# Patient Record
Sex: FEMALE | Race: BLACK OR AFRICAN AMERICAN | NOT HISPANIC OR LATINO
[De-identification: names, ages, dates, MRNs, and addresses within clinical notes are randomized per-mention and may not be internally consistent; named-entity substitution may affect disease eponyms.]

---

## 2018-01-02 ENCOUNTER — NON-APPOINTMENT (OUTPATIENT)
Age: 43
End: 2018-01-02

## 2018-01-02 ENCOUNTER — APPOINTMENT (OUTPATIENT)
Dept: CARDIOLOGY | Facility: CLINIC | Age: 43
End: 2018-01-02
Payer: MEDICAID

## 2018-01-02 VITALS
HEART RATE: 75 BPM | DIASTOLIC BLOOD PRESSURE: 80 MMHG | HEIGHT: 64 IN | OXYGEN SATURATION: 99 % | WEIGHT: 160 LBS | SYSTOLIC BLOOD PRESSURE: 120 MMHG | BODY MASS INDEX: 27.31 KG/M2

## 2018-01-02 DIAGNOSIS — I25.2 OLD MYOCARDIAL INFARCTION: ICD-10-CM

## 2018-01-02 DIAGNOSIS — R94.31 ABNORMAL ELECTROCARDIOGRAM [ECG] [EKG]: ICD-10-CM

## 2018-01-02 DIAGNOSIS — E11.9 TYPE 2 DIABETES MELLITUS W/OUT COMPLICATIONS: ICD-10-CM

## 2018-01-02 PROCEDURE — 99244 OFF/OP CNSLTJ NEW/EST MOD 40: CPT

## 2018-01-02 PROCEDURE — 93000 ELECTROCARDIOGRAM COMPLETE: CPT

## 2018-01-16 ENCOUNTER — APPOINTMENT (OUTPATIENT)
Dept: CARDIOLOGY | Facility: CLINIC | Age: 43
End: 2018-01-16
Payer: MEDICAID

## 2018-01-16 PROBLEM — I25.2 ECG: OLD MYOCARDIAL INFARCTION: Status: ACTIVE | Noted: 2018-01-16

## 2018-01-16 PROCEDURE — 93306 TTE W/DOPPLER COMPLETE: CPT

## 2020-09-19 ENCOUNTER — EMERGENCY (EMERGENCY)
Facility: HOSPITAL | Age: 45
LOS: 0 days | Discharge: HOME | End: 2020-09-19
Payer: MEDICAID

## 2020-09-19 VITALS
RESPIRATION RATE: 17 BRPM | TEMPERATURE: 98 F | DIASTOLIC BLOOD PRESSURE: 84 MMHG | SYSTOLIC BLOOD PRESSURE: 165 MMHG | OXYGEN SATURATION: 100 % | HEIGHT: 63 IN | WEIGHT: 154.1 LBS | HEART RATE: 95 BPM

## 2020-09-19 DIAGNOSIS — H57.11 OCULAR PAIN, RIGHT EYE: ICD-10-CM

## 2020-09-19 DIAGNOSIS — Z77.098 CONTACT WITH AND (SUSPECTED) EXPOSURE TO OTHER HAZARDOUS, CHIEFLY NONMEDICINAL, CHEMICALS: ICD-10-CM

## 2020-09-19 DIAGNOSIS — E11.9 TYPE 2 DIABETES MELLITUS WITHOUT COMPLICATIONS: ICD-10-CM

## 2020-09-19 DIAGNOSIS — Z79.84 LONG TERM (CURRENT) USE OF ORAL HYPOGLYCEMIC DRUGS: ICD-10-CM

## 2020-09-19 PROCEDURE — 99283 EMERGENCY DEPT VISIT LOW MDM: CPT

## 2020-09-19 RX ORDER — GLIMEPIRIDE 1 MG
1 TABLET ORAL
Qty: 0 | Refills: 0 | DISCHARGE

## 2020-09-19 RX ORDER — METFORMIN HYDROCHLORIDE 850 MG/1
1 TABLET ORAL
Qty: 0 | Refills: 0 | DISCHARGE

## 2020-09-19 RX ORDER — ERYTHROMYCIN BASE 5 MG/GRAM
1 OINTMENT (GRAM) OPHTHALMIC (EYE)
Qty: 1 | Refills: 0
Start: 2020-09-19 | End: 2020-09-23

## 2020-09-19 NOTE — ED PROVIDER NOTE - OBJECTIVE STATEMENT
46 yo female with h/o DM presents to the ED s/p getting chlorox into her right eye while cleaning 1 hour ago. UTD with tetanus. Patient c/o burning to right eye. Patient states she irrigated her at home with some improvement. Denies fever, chills, headache, dizziness, visual changes, eye discharge/excessive tearing, chest pain, sob, abd pain, N/V, Ue/LE weakness or paresthesias. no glasses or contacts.

## 2020-09-19 NOTE — ED PROVIDER NOTE - RIGHT EYE
clear/pupils equal, round, and reactive to light/+ mild conjunctival erythema, no FB, no uptake, neg zechariah sign.

## 2020-09-19 NOTE — ED PROVIDER NOTE - PATIENT PORTAL LINK FT
You can access the FollowMyHealth Patient Portal offered by [facility] by registering at the following website: [email or web address]. By joining [employer]’s FollowMyHealth portal, you will also be able to view your health information using other applications (apps) compatible with our system.

## 2020-09-19 NOTE — ED ADULT NURSE NOTE - NSIMPLEMENTINTERV_GEN_ALL_ED
Implemented All Universal Safety Interventions:  [place] to call system. Call bell, personal items and telephone within reach. Instruct patient to call for assistance. Room bathroom lighting operational. Non-slip footwear when patient is off stretcher. Physically safe environment: no spills, clutter or unnecessary equipment. Stretcher in lowest position, wheels locked, appropriate side rails in place.

## 2020-09-19 NOTE — ED ADULT TRIAGE NOTE - CHIEF COMPLAINT QUOTE
pT C/O right eye burning , while cleaning bleach splashed in her eye, flushed for approx 10 mins with water , eye still burning , some redness noted , able to see, but blurry as per pt

## 2020-09-19 NOTE — ED PROVIDER NOTE - CLINICAL SUMMARY MEDICAL DECISION MAKING FREE TEXT BOX
44 yo female with h/o DM presents to the ED s/p getting chlorox into her right eye while cleaning 1 hour ago. UTD with tetanus. Patient c/o burning to right eye. Patient states she irrigated her at home with some improvement. EXAM: VS 20/20 bilateral, no uptake with fluorescein, eye irrigated with treasure lens with 500 ccs of NS. Patient feeling significantly better. Will dc with erythromycin ointment and follow up with eye doctor on Monday. Patient understands return precautions.

## 2020-09-19 NOTE — ED PROVIDER NOTE - NSFOLLOWUPCLINICS_GEN_ALL_ED_FT
[facility] - Ophthalmology  Ophthalmology  [address], [address]  [place] [address]  Phone: [phone]  Fax:   Follow Up Time: 1-3 Days    [facility] Ophthalmology  Ophthalmology  [address], [address]  [place] [address]  Phone: [phone]  Fax:   Follow Up Time: 1-3 Days

## 2020-09-19 NOTE — ED PROVIDER NOTE - NSFOLLOWUPINSTRUCTIONS_ED_ALL_ED_FT
Take antibiotics eye ointment  Follow up with eye doctor on Monday     Eye Pain    WHAT YOU NEED TO KNOW:    Eye pain may be caused by a problem within your eye. A problem or condition in another body area can also cause pain that travels to your eye. Some causes of eye pain include dry eyes, inflammation, a sinus infection, or a cluster headache.    DISCHARGE INSTRUCTIONS:    Medicines: You may need any of the following:     Artificial tears are eyedrops that can help moisturize your eyes and relieve your pain. Ask your healthcare provider how often to use artificial tears.       NSAIDs, such as ibuprofen, help decrease swelling, pain, and fever. This medicine is available with or without a doctor's order. NSAIDs can cause stomach bleeding or kidney problems in certain people. If you take blood thinner medicine, always ask if NSAIDs are safe for you. Always read the medicine label and follow directions. Do not give these medicines to children under 6 months of age without direction from your child's healthcare provider.      Take your medicine as directed. Contact your healthcare provider if you think your medicine is not helping or if you have side effects. Tell him of her if you are allergic to any medicine. Keep a list of the medicines, vitamins, and herbs you take. Include the amounts, and when and why you take them. Bring the list or the pill bottles to follow-up visits. Carry your medicine list with you in case of an emergency.    Follow up with your healthcare provider as directed: You may be referred to an eye specialist. Write down your questions so you remember to ask them during your visits.    Contact your healthcare provider if:     You have a fever.       Your eye pain gets worse when you move your eyes.       You have questions or concerns about your condition or care.    Return to the emergency department if:     You have any vision loss.       You have sudden vision changes such as blurred vision, double vision, or seeing halos around lights.       You develop severe eye pain.          © Copyright [employer] 2019 All illustrations and images included in CareNotes are the copyrighted property of [employer] or [facility].

## 2022-10-10 ENCOUNTER — EMERGENCY (EMERGENCY)
Facility: HOSPITAL | Age: 47
LOS: 1 days | Discharge: HOME | End: 2022-10-10
Attending: EMERGENCY MEDICINE | Admitting: EMERGENCY MEDICINE
Payer: MEDICAID

## 2022-10-10 VITALS
HEART RATE: 103 BPM | DIASTOLIC BLOOD PRESSURE: 86 MMHG | OXYGEN SATURATION: 100 % | SYSTOLIC BLOOD PRESSURE: 157 MMHG | RESPIRATION RATE: 17 BRPM | TEMPERATURE: 97 F | HEIGHT: 63 IN

## 2022-10-10 VITALS
RESPIRATION RATE: 16 BRPM | TEMPERATURE: 98 F | DIASTOLIC BLOOD PRESSURE: 69 MMHG | SYSTOLIC BLOOD PRESSURE: 150 MMHG | OXYGEN SATURATION: 100 % | HEART RATE: 96 BPM

## 2022-10-10 DIAGNOSIS — R93.89 ABNORMAL FINDINGS ON DIAGNOSTIC IMAGING OF OTHER SPECIFIED BODY STRUCTURES: ICD-10-CM
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
## 2022-10-10 PROBLEM — E11.9 TYPE 2 DIABETES MELLITUS WITHOUT COMPLICATIONS: Chronic | Status: ACTIVE | Noted: 2020-09-19

## 2022-10-10 PROCEDURE — 72125 CT NECK SPINE W/O DYE: CPT | Mod: 26,MA

## 2022-10-10 PROCEDURE — 70450 CT HEAD/BRAIN W/O DYE: CPT | Mod: 26,MA

## 2022-10-10 PROCEDURE — 99291 CRITICAL CARE FIRST HOUR: CPT

## 2022-10-10 PROCEDURE — 99283 EMERGENCY DEPT VISIT LOW MDM: CPT | Mod: 1L

## 2022-10-10 RX ORDER — ACETAMINOPHEN 500 MG
975 TABLET ORAL ONCE
Refills: 0 | Status: COMPLETED | OUTPATIENT
Start: 2022-10-10 | End: 2022-10-10

## 2022-10-10 RX ADMIN — Medication 975 MILLIGRAM(S): at 18:12

## 2022-10-10 NOTE — ED PROVIDER NOTE - OBJECTIVE STATEMENT
Alfreda: Today, was at work and ran to get something, when she tripped and fell backwards and hit the back of her head. Had about five minutes of vertigo after that. No LOC. No vomiting. No neurologic symptoms. Complains of posterior headache where she hit her head, and cervical spine-area pain. Able to walk. Past medical history of diabetes. Does not take blood thinners or antiplatelet medication. Alfreda: Today, was at work and ran to get something at 13:45, when she tripped and fell backwards and hit the back of her head. Had about five minutes of vertigo after that. No LOC. No vomiting. No neurologic symptoms. Complains of posterior headache where she hit her head, and cervical spine-area pain. Able to walk. Past medical history of diabetes. Does not take blood thinners or antiplatelet medication.

## 2022-10-10 NOTE — CONSULT NOTE ADULT - SUBJECTIVE AND OBJECTIVE BOX
48 YO female was at work and ran to get something at 13:45, when she tripped and fell backwards and hit the back of her head. Had about five minutes of vertigo after that. No LOC. No vomiting. No neurologic symptoms. Complains of posterior headache where she hit her head which has resolved. PMH DM, on no AC or antiplatelet agent. 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    WDWN female in NAD  Vital Signs Last 24 Hrs  T(C): 36.2 (10 Oct 2022 16:02), Max: 36.2 (10 Oct 2022 16:02)  T(F): 97.1 (10 Oct 2022 16:02), Max: 97.1 (10 Oct 2022 16:02)  HR: 103 (10 Oct 2022 16:02) (103 - 103)  BP: 157/86 (10 Oct 2022 16:02) (157/86 - 157/86)  BP(mean): --  RR: 17 (10 Oct 2022 16:02) (17 - 17)  SpO2: 100% (10 Oct 2022 16:02) (100% - 100%)    Parameters below as of 10 Oct 2022 16:02  Patient On (Oxygen Delivery Method): room air    AAO X 3  PERRLA, EOMI  CN 2-12 grossly intact  CABALLERO, LUE with limited ROM, decreased strength (At baseline per patient)  RUE, Bilateral LE 5/5  No drift  SILT    < from: CT Head No Cont (10.10.22 @ 18:51) >  CT HEAD:  VENTRICLES AND SULCI:  No hydrocephalus.  INTRA-AXIAL:  Small 4 mm hyperdense focus in the left frontal region   (2:9). A similar small focus is present in the anterior right frontal   lobe (2:15). No significant mass effect or midline shift. Mild chronic   white matter microvascular changes.  EXTRA-AXIAL:  No mass or collection is seen.  VISUALIZED SINUSES:  Clear.  VISUALIZED MASTOIDS:  Clear.  CALVARIUM:  Intact.    < end of copied text >

## 2022-10-10 NOTE — ED PROVIDER NOTE - PATIENT PORTAL LINK FT
[Fully active, able to carry on all pre-disease performance without restriction] : Status 0 - Fully active, able to carry on all pre-disease performance without restriction [Obese] : obese [Normal] : affect appropriate You can access the FollowMyHealth Patient Portal offered by [facility] by registering at the following website: [email or web address]. By joining [email or web address]’s FollowMyHealth portal, you will also be able to view your health information using other applications (apps) compatible with our system.

## 2022-10-10 NOTE — ED ADULT NURSE NOTE - OBJECTIVE STATEMENT
Break coverage- Pt received into intake spot #4. AAOx4, s/p fall today with head injury. Pt states she slipped and landed backwards hitting the back of her head. Denies LOC. c/o dizziness after hitting her head. Denies blurry vision/n/v. MD parker performed. Medicated with Tylenol for headache. no acute distress. Awaiting CT scan.

## 2022-10-10 NOTE — CONSULT NOTE ADULT - ASSESSMENT
48 YO female S/P fall with head strike with punctate hyperdensities on head CT. No neurosurgical intervention is needed

## 2022-10-10 NOTE — CONSULT NOTE ADULT - PROBLEM SELECTOR RECOMMENDATION 9
Interval CT for stability in 4-6 hours  If stable may discharge home, outpatient follow up with Dr Crystal next week

## 2022-10-10 NOTE — ED PROVIDER NOTE - CLINICAL SUMMARY MEDICAL DECISION MAKING FREE TEXT BOX
Alfreda: Will CT brain and neck, as her work sent her here for these, and because she has midline cervical spine tenderness.

## 2022-10-10 NOTE — ED PROVIDER NOTE - CRITICAL CARE ATTENDING CONTRIBUTION TO CARE
I performed a face-to-face evaluation of the patient and performed a history and physical examination. I agree with the history and physical examination. If this was a PA visit, I personally saw the patient with the PA and performed a substantive portion of the visit including all aspects of the medical decision making.    I have personally provided the amount of critical care time documented below, excluding time spent on separate procedures. I spoke with the consulting service or read their note/recommendations.

## 2022-10-10 NOTE — ED PROVIDER NOTE - PHYSICAL EXAMINATION
Well appearing, well nourished, awake, alert, oriented to person, place, time/situation and in no apparent distress.    Airway patent    Eyes without scleral injection. No jaundice.    Strong pulse.    Respirations unlabored.    Abdomen soft, non-tender, no guarding.    MSK: TTP midline C-spine.    Alert and oriented, no gross motor or sensory deficits.    Skin normal color for race, warm, dry and intact. No evidence of rash.    No SI/HI.

## 2022-10-10 NOTE — ED ADULT TRIAGE NOTE - CHIEF COMPLAINT QUOTE
pt slipped on wood floor, falling backward hitting head. c/o headache and neck pain. denies LOC and visual changes.

## 2022-10-10 NOTE — ED PROVIDER NOTE - NSFOLLOWUPINSTRUCTIONS_ED_ALL_ED_FT
Take medications as prescribed for: headache.    Tylenol 500 mg (1 or 2 every 6 hours) and/or naproxen 500 mg (1 every 12 hours) for pain.     Use cold compresses (such as ice in a plastic bag) over affected areas for 15 minutes 3 times a day x 3 days. Then, use warm compresses (such as warm rice in a plastic bag) for 15 minutes 3 times a day.     Return if pain not controlled with oral medications.     Return if neurologic symptoms such as unable move arms or legs, or if you have slurred speech.     If not improved in 2 weeks, follow with either:     [facility] (comprehensive, with Neurology, Orthopedic Surgery, Neurosurgery, and Physical Therapy): [phone].     Or Spine Surgeon Dr. Kohler:    [phone]  [email or web address]  Email: [email or web address]    1. [place] Address: [address], [address]. Hours: Mon-Fri 9am - 5pm and every 2nd Sat 9am - 3pm.    2. [place] Address: [address] [place] [address] ([address] [facility]). Hours: Every Thursday 10am - 5pm.    3. [place] Address: [address] [facility] [facility] [address] [facility] [address] ([address] [facility]). Hours: 10am - 5pm Every Tuesday (except 2nd) & Every 2nd Wednesday.

## 2022-10-11 PROCEDURE — 70450 CT HEAD/BRAIN W/O DYE: CPT | Mod: 26,MA
